# Patient Record
Sex: FEMALE | Employment: UNEMPLOYED | URBAN - METROPOLITAN AREA
[De-identification: names, ages, dates, MRNs, and addresses within clinical notes are randomized per-mention and may not be internally consistent; named-entity substitution may affect disease eponyms.]

---

## 2017-05-01 ENCOUNTER — HOSPITAL ENCOUNTER (OUTPATIENT)
Dept: CT IMAGING | Age: 52
Discharge: HOME | End: 2017-05-01
Attending: INTERNAL MEDICINE

## 2017-05-01 VITALS — SYSTOLIC BLOOD PRESSURE: 121 MMHG | DIASTOLIC BLOOD PRESSURE: 79 MMHG

## 2017-05-01 DIAGNOSIS — Z12.31 VISIT FOR SCREENING MAMMOGRAM: ICD-10-CM

## 2017-05-01 NOTE — IMAGING NOTE
Pt here for calcium score. No labs per pt . Pt came in today due to strong family hx mom plus had 2 brothers  had heart attack and sister with stroke.  Calcium score = 0      Pt  verbalizes  md  wanting her to do an additional \"8 point screening\" pt  Had

## 2024-06-04 ENCOUNTER — ORDER TRANSCRIPTION (OUTPATIENT)
Dept: ADMINISTRATIVE | Facility: HOSPITAL | Age: 59
End: 2024-06-04

## 2024-06-04 DIAGNOSIS — Z13.6 SCREENING FOR CARDIOVASCULAR CONDITION: Primary | ICD-10-CM

## 2024-06-27 ENCOUNTER — HOSPITAL ENCOUNTER (OUTPATIENT)
Dept: CT IMAGING | Age: 59
Discharge: HOME | End: 2024-06-27
Attending: INTERNAL MEDICINE

## 2024-06-27 VITALS
DIASTOLIC BLOOD PRESSURE: 82 MMHG | WEIGHT: 180 LBS | HEIGHT: 64 IN | SYSTOLIC BLOOD PRESSURE: 130 MMHG | BODY MASS INDEX: 30.73 KG/M2

## 2024-06-27 DIAGNOSIS — Z13.6 SCREENING FOR CARDIOVASCULAR CONDITION: ICD-10-CM

## 2024-06-27 LAB
POCT GLUCOSE CHOLESTECH: 97 (ref 70–99)
POCT HDL: 71 (ref 40–60)
POCT LDL: 97 (ref 0–99)
POCT TOTAL CHOLESTEROL: 197 (ref 110–200)
POCT TRIGLYCERIDES: 146 (ref 1–149)

## 2024-06-27 NOTE — PROGRESS NOTES
Date of Service 6/27/2024    YUE HARPER  Date of Birth 1/7/1965    Patient Age: 59 year old    PCP: Katiana Kirk  [address] [facility] [address]    Heart Scan Consult  Preliminary Heart Scan Score: 0    Previous Screening  Heart Scan Completed Previously: Yes  Year of last heart scan: 2017  Score of last heart scan: 0  Peripheral Vascular Scan Completed Previously: No          Risk Factors  Personal Risk Factors  Non-alterable Risk Factors: Age;Family History  Alterable Risk Factors: Abnormal Cholesterol;High Blood Pressure;Obesity      Body Mass Index  Body mass index is 30.9 kg/m².    Blood Pressure     /82    Takes medication for B/P        (Normal =< 120/80,  Elevated = 120-129/ >80,  High Stage1 130-139/80-89 , Stage2 >140/>90)    Lipid Profile  Patient was in fasting state: Yes    Cholesterol: 197, done on 6/27/2024.  HDL Cholesterol: 71, done on 6/27/2024.  LDL Cholesterol: 97, done on 6/27/2024.  TriGlycerides 146, done on 6/27/2024.    Taking zetia and crestor     Cholesterol Goals  Value   Total  =< 200   HDL  = > 45 Men = > 55 Women   LDL   =< 100   Triglycerides  =< 150       Glucose and Hemoglobin A1C  No results found for: \"GLU\", \"A1C\"  (Normal Fasting Glucose < 100mg/dl )    Nurse Review  Risk factor information and results reviewed with Nurse: Yes    Recommended Follow Up:  Consult your physician regarding:: Final Heart Scan Report;Discuss potential for Incidental Finding;Discuss Potential for Score Variance      Recommendations for Change:  Nutrition Changes: Low Saturated Fat;Low Fat Dairy;Low Salt Eating;Increase Fiber    Cholesterol Modification (goal of therapy depends upon your risk):  (zetia and crestor LDL < 100)    Exercise: Enhance Current Program (avg steps 9000-08947 /day)         Weight Management: Decrease Current Weight         Repeat Heart Scan: 5 years if Calcium Score is 0.0;Discuss with your Physician              [place] Recommended  Resources:  Recommended Resources: Upcoming Classes, Medical Services and Health Library [email or web address]            Samantha MONTIEL RN        Please Contact the Nurse Heart Line with any Questions or Concerns [phone].